# Patient Record
Sex: FEMALE | Race: WHITE | Employment: OTHER | URBAN - METROPOLITAN AREA
[De-identification: names, ages, dates, MRNs, and addresses within clinical notes are randomized per-mention and may not be internally consistent; named-entity substitution may affect disease eponyms.]

---

## 2020-12-30 PROBLEM — M19.111 POST-TRAUMATIC OSTEOARTHRITIS OF RIGHT SHOULDER: Status: ACTIVE | Noted: 2020-12-30

## 2021-01-04 PROBLEM — S72.001D CLOSED FRACTURE OF NECK OF RIGHT FEMUR WITH ROUTINE HEALING, SUBSEQUENT ENCOUNTER: Status: ACTIVE | Noted: 2018-09-28

## 2021-01-04 PROBLEM — R06.00 DYSPNEA: Status: ACTIVE | Noted: 2021-01-04

## 2021-01-04 PROBLEM — M48.062 SPINAL STENOSIS OF LUMBAR REGION WITH NEUROGENIC CLAUDICATION: Status: ACTIVE | Noted: 2017-10-13

## 2021-01-04 PROBLEM — K59.00 CONSTIPATION, UNSPECIFIED CONSTIPATION TYPE: Status: ACTIVE | Noted: 2018-10-08

## 2021-01-04 PROBLEM — M47.14 THORACIC MYELOPATHY: Status: ACTIVE | Noted: 2017-06-05

## 2021-01-04 PROBLEM — B34.8 PARAINFLUENZA: Status: ACTIVE | Noted: 2019-04-23

## 2021-01-04 PROBLEM — K59.01 SLOW TRANSIT CONSTIPATION: Status: ACTIVE | Noted: 2019-05-05

## 2021-01-04 PROBLEM — R10.9 ABDOMINAL PAIN: Status: ACTIVE | Noted: 2020-01-03

## 2021-01-04 PROBLEM — S06.4X9A EPIDURAL HEMATOMA (HCC): Status: ACTIVE | Noted: 2017-06-05

## 2021-01-04 PROBLEM — R93.89 ABNORMAL FINDING ON IMAGING: Status: ACTIVE | Noted: 2020-01-03

## 2021-01-04 PROBLEM — M25.461 EFFUSION, RIGHT KNEE: Status: ACTIVE | Noted: 2018-11-15

## 2021-01-04 PROBLEM — E03.9 HYPOTHYROID: Status: ACTIVE | Noted: 2018-11-15

## 2021-01-04 PROBLEM — I10 BENIGN ESSENTIAL HTN: Status: ACTIVE | Noted: 2018-11-15

## 2021-01-04 PROBLEM — D64.9 ANEMIA, UNSPECIFIED TYPE: Status: ACTIVE | Noted: 2018-09-28

## 2021-01-04 PROBLEM — M54.40 LUMBAGO WITH SCIATICA, UNSPECIFIED SIDE: Status: ACTIVE | Noted: 2018-10-11

## 2021-01-04 PROBLEM — J96.21 ACUTE AND CHRONIC RESPIRATORY FAILURE WITH HYPOXIA (HCC): Status: ACTIVE | Noted: 2021-01-04

## 2021-01-04 PROBLEM — I27.20 PULMONARY HYPERTENSION (HCC): Status: ACTIVE | Noted: 2019-04-23

## 2021-01-04 PROBLEM — M54.9 BACK PAIN, UNSPECIFIED BACK LOCATION, UNSPECIFIED BACK PAIN LATERALITY, UNSPECIFIED CHRONICITY: Status: ACTIVE | Noted: 2019-04-27

## 2021-01-04 PROBLEM — M25.551 PAIN IN RIGHT HIP: Status: ACTIVE | Noted: 2018-09-28

## 2021-01-04 PROBLEM — K86.89 MASS OF PANCREAS: Status: ACTIVE | Noted: 2020-01-03

## 2021-01-04 PROBLEM — M48.00 SPINAL STENOSIS, UNSPECIFIED SPINAL REGION: Status: ACTIVE | Noted: 2019-05-24

## 2021-01-04 PROBLEM — N18.2 CKD (CHRONIC KIDNEY DISEASE) STAGE 2, GFR 60-89 ML/MIN: Status: ACTIVE | Noted: 2018-11-05

## 2021-01-04 PROBLEM — R14.0 GASES: Status: ACTIVE | Noted: 2018-10-18

## 2021-01-04 PROBLEM — R53.1 WEAKNESS: Status: ACTIVE | Noted: 2018-09-28

## 2021-01-04 PROBLEM — M25.511 ACUTE PAIN OF RIGHT SHOULDER: Status: ACTIVE | Noted: 2019-05-09

## 2021-01-04 PROBLEM — J44.9 CHRONIC OBSTRUCTIVE PULMONARY DISEASE, UNSPECIFIED COPD TYPE (HCC): Status: ACTIVE | Noted: 2019-04-23

## 2021-02-12 RX ORDER — SODIUM PHOSPHATE,MONO-DIBASIC 19G-7G/118
1 ENEMA (ML) RECTAL ONCE AS NEEDED
COMMUNITY

## 2021-02-12 RX ORDER — MENTHOL 40 MG/ML
GEL TOPICAL EVERY 8 HOURS PRN
COMMUNITY

## 2021-02-12 RX ORDER — CALCIUM CARBONATE 200(500)MG
1 TABLET,CHEWABLE ORAL 3 TIMES DAILY PRN
COMMUNITY

## 2021-02-12 RX ORDER — POTASSIUM CHLORIDE 1.5 G/1.77G
20 POWDER, FOR SOLUTION ORAL DAILY
COMMUNITY

## 2021-02-12 RX ORDER — SENNOSIDES 8.6 MG
8.6 TABLET ORAL 2 TIMES DAILY
COMMUNITY

## 2021-02-12 RX ORDER — CALAMINE
LOTION (ML) TOPICAL 3 TIMES DAILY PRN
COMMUNITY

## 2021-02-12 RX ORDER — DIPHENHYDRAMINE CITRATE AND IBUPROFEN 38; 200 MG/1; MG/1
1 TABLET, COATED ORAL AS NEEDED
COMMUNITY

## 2021-02-12 RX ORDER — TORSEMIDE 20 MG/1
20 TABLET ORAL 2 TIMES DAILY
Status: ON HOLD | COMMUNITY
End: 2021-02-18

## 2021-02-13 NOTE — PAT NURSING NOTE
Spoke with Dhiraj Samayoa at [facility] re faxing medication list, will fax over list, Patient had a negative COVID test today and they would be unable to re peat test this weekend due to their lab is closed due to the holiday Monday.

## 2021-02-15 RX ORDER — MEMANTINE HYDROCHLORIDE 10 MG/1
10 TABLET ORAL DAILY
COMMUNITY
End: 2021-02-15

## 2021-02-16 ENCOUNTER — HOSPITAL ENCOUNTER (INPATIENT)
Facility: HOSPITAL | Age: 86
LOS: 2 days | Discharge: NURSING, REHAB OR LONG-TERM CARE | DRG: 483 | End: 2021-02-18
Attending: ORTHOPAEDIC SURGERY | Admitting: ORTHOPAEDIC SURGERY
Payer: MEDICARE

## 2021-02-16 ENCOUNTER — ANESTHESIA (OUTPATIENT)
Dept: SURGERY | Facility: HOSPITAL | Age: 86
DRG: 483 | End: 2021-02-16
Payer: MEDICARE

## 2021-02-16 ENCOUNTER — ANESTHESIA EVENT (OUTPATIENT)
Dept: SURGERY | Facility: HOSPITAL | Age: 86
DRG: 483 | End: 2021-02-16
Payer: MEDICARE

## 2021-02-16 ENCOUNTER — APPOINTMENT (OUTPATIENT)
Dept: GENERAL RADIOLOGY | Facility: HOSPITAL | Age: 86
DRG: 483 | End: 2021-02-16
Attending: ORTHOPAEDIC SURGERY
Payer: MEDICARE

## 2021-02-16 DIAGNOSIS — M25.511 ACUTE PAIN OF RIGHT SHOULDER: ICD-10-CM

## 2021-02-16 DIAGNOSIS — M19.111 POST-TRAUMATIC OSTEOARTHRITIS OF RIGHT SHOULDER: ICD-10-CM

## 2021-02-16 LAB
MRSA DNA SPEC QL NAA+PROBE: POSITIVE
SARS-COV-2 RNA RESP QL NAA+PROBE: NOT DETECTED

## 2021-02-16 PROCEDURE — 76942 ECHO GUIDE FOR BIOPSY: CPT | Performed by: ANESTHESIOLOGY

## 2021-02-16 PROCEDURE — 76942 ECHO GUIDE FOR BIOPSY: CPT | Performed by: ORTHOPAEDIC SURGERY

## 2021-02-16 PROCEDURE — 88311 DECALCIFY TISSUE: CPT | Performed by: ORTHOPAEDIC SURGERY

## 2021-02-16 PROCEDURE — 0RRJ00Z REPLACEMENT OF RIGHT SHOULDER JOINT WITH REVERSE BALL AND SOCKET SYNTHETIC SUBSTITUTE, OPEN APPROACH: ICD-10-PCS | Performed by: ORTHOPAEDIC SURGERY

## 2021-02-16 PROCEDURE — 73030 X-RAY EXAM OF SHOULDER: CPT | Performed by: ORTHOPAEDIC SURGERY

## 2021-02-16 PROCEDURE — 87641 MR-STAPH DNA AMP PROBE: CPT | Performed by: ORTHOPAEDIC SURGERY

## 2021-02-16 PROCEDURE — 0LS30ZZ REPOSITION RIGHT UPPER ARM TENDON, OPEN APPROACH: ICD-10-PCS | Performed by: ORTHOPAEDIC SURGERY

## 2021-02-16 PROCEDURE — 3E0T3BZ INTRODUCTION OF ANESTHETIC AGENT INTO PERIPHERAL NERVES AND PLEXI, PERCUTANEOUS APPROACH: ICD-10-PCS | Performed by: STUDENT IN AN ORGANIZED HEALTH CARE EDUCATION/TRAINING PROGRAM

## 2021-02-16 PROCEDURE — 88305 TISSUE EXAM BY PATHOLOGIST: CPT | Performed by: ORTHOPAEDIC SURGERY

## 2021-02-16 PROCEDURE — 64415 NJX AA&/STRD BRCH PLXS IMG: CPT | Performed by: ORTHOPAEDIC SURGERY

## 2021-02-16 PROCEDURE — 36415 COLL VENOUS BLD VENIPUNCTURE: CPT | Performed by: ORTHOPAEDIC SURGERY

## 2021-02-16 DEVICE — IMPLANTABLE DEVICE
Type: IMPLANTABLE DEVICE | Site: SHOULDER | Status: FUNCTIONAL
Brand: FLEX SHOULDER SYSTEM

## 2021-02-16 RX ORDER — MORPHINE SULFATE 2 MG/ML
2 INJECTION, SOLUTION INTRAMUSCULAR; INTRAVENOUS EVERY 2 HOUR PRN
Status: DISCONTINUED | OUTPATIENT
Start: 2021-02-16 | End: 2021-02-18

## 2021-02-16 RX ORDER — BUPROPION HYDROCHLORIDE 150 MG/1
150 TABLET ORAL DAILY
Status: DISCONTINUED | OUTPATIENT
Start: 2021-02-17 | End: 2021-02-18

## 2021-02-16 RX ORDER — PRAMIPEXOLE DIHYDROCHLORIDE 0.5 MG/1
0.5 TABLET ORAL 2 TIMES DAILY PRN
Status: DISCONTINUED | OUTPATIENT
Start: 2021-02-16 | End: 2021-02-18

## 2021-02-16 RX ORDER — TRAMADOL HYDROCHLORIDE 50 MG/1
100 TABLET ORAL EVERY 6 HOURS PRN
Status: DISCONTINUED | OUTPATIENT
Start: 2021-02-16 | End: 2021-02-18

## 2021-02-16 RX ORDER — PROCHLORPERAZINE EDISYLATE 5 MG/ML
5 INJECTION INTRAMUSCULAR; INTRAVENOUS ONCE AS NEEDED
Status: DISCONTINUED | OUTPATIENT
Start: 2021-02-16 | End: 2021-02-16 | Stop reason: HOSPADM

## 2021-02-16 RX ORDER — SODIUM PHOSPHATE, DIBASIC AND SODIUM PHOSPHATE, MONOBASIC 7; 19 G/133ML; G/133ML
1 ENEMA RECTAL ONCE AS NEEDED
Status: DISCONTINUED | OUTPATIENT
Start: 2021-02-16 | End: 2021-02-18

## 2021-02-16 RX ORDER — VANCOMYCIN HYDROCHLORIDE
15 ONCE
Status: COMPLETED | OUTPATIENT
Start: 2021-02-16 | End: 2021-02-17

## 2021-02-16 RX ORDER — MORPHINE SULFATE 2 MG/ML
1 INJECTION, SOLUTION INTRAMUSCULAR; INTRAVENOUS EVERY 2 HOUR PRN
Status: DISCONTINUED | OUTPATIENT
Start: 2021-02-16 | End: 2021-02-18

## 2021-02-16 RX ORDER — TRAMADOL HYDROCHLORIDE 50 MG/1
50 TABLET ORAL EVERY 6 HOURS PRN
Status: DISCONTINUED | OUTPATIENT
Start: 2021-02-16 | End: 2021-02-18

## 2021-02-16 RX ORDER — GLYCOPYRROLATE 0.2 MG/ML
INJECTION, SOLUTION INTRAMUSCULAR; INTRAVENOUS AS NEEDED
Status: DISCONTINUED | OUTPATIENT
Start: 2021-02-16 | End: 2021-02-16 | Stop reason: SURG

## 2021-02-16 RX ORDER — GABAPENTIN 400 MG/1
400 CAPSULE ORAL 3 TIMES DAILY
Status: DISCONTINUED | OUTPATIENT
Start: 2021-02-16 | End: 2021-02-17

## 2021-02-16 RX ORDER — MIDAZOLAM HYDROCHLORIDE 1 MG/ML
INJECTION INTRAMUSCULAR; INTRAVENOUS
Status: COMPLETED | OUTPATIENT
Start: 2021-02-16 | End: 2021-02-16

## 2021-02-16 RX ORDER — PHENYLEPHRINE HCL 10 MG/ML
VIAL (ML) INJECTION AS NEEDED
Status: DISCONTINUED | OUTPATIENT
Start: 2021-02-16 | End: 2021-02-16 | Stop reason: SURG

## 2021-02-16 RX ORDER — SODIUM CHLORIDE 0.9 % (FLUSH) 0.9 %
10 SYRINGE (ML) INJECTION AS NEEDED
Status: DISCONTINUED | OUTPATIENT
Start: 2021-02-16 | End: 2021-02-18

## 2021-02-16 RX ORDER — PANTOPRAZOLE SODIUM 40 MG/1
40 TABLET, DELAYED RELEASE ORAL
Status: DISCONTINUED | OUTPATIENT
Start: 2021-02-17 | End: 2021-02-18

## 2021-02-16 RX ORDER — CLONAZEPAM 1 MG/1
1 TABLET ORAL 2 TIMES DAILY
Status: DISCONTINUED | OUTPATIENT
Start: 2021-02-16 | End: 2021-02-17

## 2021-02-16 RX ORDER — DOCUSATE SODIUM 100 MG/1
100 CAPSULE, LIQUID FILLED ORAL 2 TIMES DAILY
Status: DISCONTINUED | OUTPATIENT
Start: 2021-02-16 | End: 2021-02-18

## 2021-02-16 RX ORDER — ONDANSETRON 2 MG/ML
INJECTION INTRAMUSCULAR; INTRAVENOUS AS NEEDED
Status: DISCONTINUED | OUTPATIENT
Start: 2021-02-16 | End: 2021-02-16 | Stop reason: SURG

## 2021-02-16 RX ORDER — HYDROMORPHONE HYDROCHLORIDE 1 MG/ML
0.6 INJECTION, SOLUTION INTRAMUSCULAR; INTRAVENOUS; SUBCUTANEOUS EVERY 5 MIN PRN
Status: DISCONTINUED | OUTPATIENT
Start: 2021-02-16 | End: 2021-02-16 | Stop reason: HOSPADM

## 2021-02-16 RX ORDER — ALBUTEROL SULFATE 90 UG/1
1 AEROSOL, METERED RESPIRATORY (INHALATION) EVERY 4 HOURS PRN
Status: DISCONTINUED | OUTPATIENT
Start: 2021-02-16 | End: 2021-02-17

## 2021-02-16 RX ORDER — SODIUM CHLORIDE, SODIUM LACTATE, POTASSIUM CHLORIDE, CALCIUM CHLORIDE 600; 310; 30; 20 MG/100ML; MG/100ML; MG/100ML; MG/100ML
INJECTION, SOLUTION INTRAVENOUS CONTINUOUS
Status: DISCONTINUED | OUTPATIENT
Start: 2021-02-16 | End: 2021-02-16

## 2021-02-16 RX ORDER — MORPHINE SULFATE 4 MG/ML
4 INJECTION, SOLUTION INTRAMUSCULAR; INTRAVENOUS EVERY 2 HOUR PRN
Status: DISCONTINUED | OUTPATIENT
Start: 2021-02-16 | End: 2021-02-18

## 2021-02-16 RX ORDER — LEVOTHYROXINE SODIUM 0.03 MG/1
25 TABLET ORAL DAILY
Status: DISCONTINUED | OUTPATIENT
Start: 2021-02-17 | End: 2021-02-18

## 2021-02-16 RX ORDER — ONDANSETRON 2 MG/ML
4 INJECTION INTRAMUSCULAR; INTRAVENOUS ONCE AS NEEDED
Status: DISCONTINUED | OUTPATIENT
Start: 2021-02-16 | End: 2021-02-16 | Stop reason: HOSPADM

## 2021-02-16 RX ORDER — LIDOCAINE HYDROCHLORIDE 10 MG/ML
INJECTION, SOLUTION EPIDURAL; INFILTRATION; INTRACAUDAL; PERINEURAL AS NEEDED
Status: DISCONTINUED | OUTPATIENT
Start: 2021-02-16 | End: 2021-02-16 | Stop reason: SURG

## 2021-02-16 RX ORDER — VANCOMYCIN HYDROCHLORIDE
15
Status: COMPLETED | OUTPATIENT
Start: 2021-02-16 | End: 2021-02-16

## 2021-02-16 RX ORDER — METOPROLOL TARTRATE 5 MG/5ML
2.5 INJECTION INTRAVENOUS ONCE
Status: DISCONTINUED | OUTPATIENT
Start: 2021-02-16 | End: 2021-02-16 | Stop reason: HOSPADM

## 2021-02-16 RX ORDER — TRANEXAMIC ACID 10 MG/ML
INJECTION, SOLUTION INTRAVENOUS AS NEEDED
Status: DISCONTINUED | OUTPATIENT
Start: 2021-02-16 | End: 2021-02-16 | Stop reason: SURG

## 2021-02-16 RX ORDER — ONDANSETRON 2 MG/ML
4 INJECTION INTRAMUSCULAR; INTRAVENOUS EVERY 4 HOURS PRN
Status: ACTIVE | OUTPATIENT
Start: 2021-02-16 | End: 2021-02-18

## 2021-02-16 RX ORDER — ENOXAPARIN SODIUM 100 MG/ML
40 INJECTION SUBCUTANEOUS DAILY
Status: DISCONTINUED | OUTPATIENT
Start: 2021-02-16 | End: 2021-02-16

## 2021-02-16 RX ORDER — SENNOSIDES 8.6 MG
17.2 TABLET ORAL NIGHTLY
Status: DISCONTINUED | OUTPATIENT
Start: 2021-02-16 | End: 2021-02-18

## 2021-02-16 RX ORDER — ROCURONIUM BROMIDE 10 MG/ML
INJECTION, SOLUTION INTRAVENOUS AS NEEDED
Status: DISCONTINUED | OUTPATIENT
Start: 2021-02-16 | End: 2021-02-16 | Stop reason: SURG

## 2021-02-16 RX ORDER — POLYETHYLENE GLYCOL 3350 17 G/17G
17 POWDER, FOR SOLUTION ORAL DAILY PRN
Status: DISCONTINUED | OUTPATIENT
Start: 2021-02-16 | End: 2021-02-18

## 2021-02-16 RX ORDER — DIPHENHYDRAMINE HYDROCHLORIDE 50 MG/ML
25 INJECTION INTRAMUSCULAR; INTRAVENOUS ONCE AS NEEDED
Status: ACTIVE | OUTPATIENT
Start: 2021-02-16 | End: 2021-02-16

## 2021-02-16 RX ORDER — HYDROMORPHONE HYDROCHLORIDE 1 MG/ML
0.2 INJECTION, SOLUTION INTRAMUSCULAR; INTRAVENOUS; SUBCUTANEOUS EVERY 5 MIN PRN
Status: DISCONTINUED | OUTPATIENT
Start: 2021-02-16 | End: 2021-02-16 | Stop reason: HOSPADM

## 2021-02-16 RX ORDER — ENOXAPARIN SODIUM 100 MG/ML
40 INJECTION SUBCUTANEOUS DAILY
Status: DISCONTINUED | OUTPATIENT
Start: 2021-02-16 | End: 2021-02-18

## 2021-02-16 RX ORDER — CEFAZOLIN SODIUM/WATER 2 G/20 ML
2 SYRINGE (ML) INTRAVENOUS ONCE
Status: DISCONTINUED | OUTPATIENT
Start: 2021-02-16 | End: 2021-02-16

## 2021-02-16 RX ORDER — LATANOPROST 50 UG/ML
1 SOLUTION/ DROPS OPHTHALMIC NIGHTLY
Status: DISCONTINUED | OUTPATIENT
Start: 2021-02-16 | End: 2021-02-18

## 2021-02-16 RX ORDER — DEXAMETHASONE SODIUM PHOSPHATE 4 MG/ML
VIAL (ML) INJECTION AS NEEDED
Status: DISCONTINUED | OUTPATIENT
Start: 2021-02-16 | End: 2021-02-16 | Stop reason: SURG

## 2021-02-16 RX ORDER — ACETAMINOPHEN 325 MG/1
650 TABLET ORAL EVERY 4 HOURS PRN
Status: DISCONTINUED | OUTPATIENT
Start: 2021-02-16 | End: 2021-02-18

## 2021-02-16 RX ORDER — FAMOTIDINE 20 MG/1
20 TABLET ORAL ONCE
Status: DISCONTINUED | OUTPATIENT
Start: 2021-02-16 | End: 2021-02-16 | Stop reason: HOSPADM

## 2021-02-16 RX ORDER — BISACODYL 10 MG
10 SUPPOSITORY, RECTAL RECTAL
Status: DISCONTINUED | OUTPATIENT
Start: 2021-02-16 | End: 2021-02-18

## 2021-02-16 RX ORDER — PROCHLORPERAZINE EDISYLATE 5 MG/ML
10 INJECTION INTRAMUSCULAR; INTRAVENOUS EVERY 6 HOURS PRN
Status: ACTIVE | OUTPATIENT
Start: 2021-02-16 | End: 2021-02-18

## 2021-02-16 RX ORDER — HYDROMORPHONE HYDROCHLORIDE 1 MG/ML
0.4 INJECTION, SOLUTION INTRAMUSCULAR; INTRAVENOUS; SUBCUTANEOUS EVERY 5 MIN PRN
Status: DISCONTINUED | OUTPATIENT
Start: 2021-02-16 | End: 2021-02-16 | Stop reason: HOSPADM

## 2021-02-16 RX ORDER — LIDOCAINE HYDROCHLORIDE 10 MG/ML
INJECTION, SOLUTION INFILTRATION; PERINEURAL
Status: COMPLETED | OUTPATIENT
Start: 2021-02-16 | End: 2021-02-16

## 2021-02-16 RX ORDER — BRIMONIDINE TARTRATE 2 MG/ML
1 SOLUTION/ DROPS OPHTHALMIC 2 TIMES DAILY
Status: DISCONTINUED | OUTPATIENT
Start: 2021-02-17 | End: 2021-02-18

## 2021-02-16 RX ORDER — SODIUM CHLORIDE, SODIUM LACTATE, POTASSIUM CHLORIDE, CALCIUM CHLORIDE 600; 310; 30; 20 MG/100ML; MG/100ML; MG/100ML; MG/100ML
INJECTION, SOLUTION INTRAVENOUS CONTINUOUS
Status: DISCONTINUED | OUTPATIENT
Start: 2021-02-16 | End: 2021-02-16 | Stop reason: HOSPADM

## 2021-02-16 RX ORDER — DEXAMETHASONE SODIUM PHOSPHATE 10 MG/ML
INJECTION, SOLUTION INTRAMUSCULAR; INTRAVENOUS
Status: COMPLETED | OUTPATIENT
Start: 2021-02-16 | End: 2021-02-16

## 2021-02-16 RX ORDER — HALOPERIDOL 5 MG/ML
0.25 INJECTION INTRAMUSCULAR ONCE AS NEEDED
Status: DISCONTINUED | OUTPATIENT
Start: 2021-02-16 | End: 2021-02-16 | Stop reason: HOSPADM

## 2021-02-16 RX ORDER — NALOXONE HYDROCHLORIDE 0.4 MG/ML
80 INJECTION, SOLUTION INTRAMUSCULAR; INTRAVENOUS; SUBCUTANEOUS AS NEEDED
Status: DISCONTINUED | OUTPATIENT
Start: 2021-02-16 | End: 2021-02-16 | Stop reason: HOSPADM

## 2021-02-16 RX ORDER — SODIUM CHLORIDE, SODIUM LACTATE, POTASSIUM CHLORIDE, CALCIUM CHLORIDE 600; 310; 30; 20 MG/100ML; MG/100ML; MG/100ML; MG/100ML
INJECTION, SOLUTION INTRAVENOUS CONTINUOUS
Status: DISCONTINUED | OUTPATIENT
Start: 2021-02-16 | End: 2021-02-17

## 2021-02-16 RX ORDER — ROPIVACAINE HYDROCHLORIDE 5 MG/ML
INJECTION, SOLUTION EPIDURAL; INFILTRATION; PERINEURAL
Status: COMPLETED | OUTPATIENT
Start: 2021-02-16 | End: 2021-02-16

## 2021-02-16 RX ADMIN — ONDANSETRON 4 MG: 2 INJECTION INTRAMUSCULAR; INTRAVENOUS at 11:28:00

## 2021-02-16 RX ADMIN — ROPIVACAINE HYDROCHLORIDE 30 ML: 5 INJECTION, SOLUTION EPIDURAL; INFILTRATION; PERINEURAL at 11:11:00

## 2021-02-16 RX ADMIN — PHENYLEPHRINE HCL 100 MCG: 10 MG/ML VIAL (ML) INJECTION at 11:47:00

## 2021-02-16 RX ADMIN — ROCURONIUM BROMIDE 40 MG: 10 INJECTION, SOLUTION INTRAVENOUS at 11:28:00

## 2021-02-16 RX ADMIN — TRANEXAMIC ACID 1000 MG: 10 INJECTION, SOLUTION INTRAVENOUS at 11:54:00

## 2021-02-16 RX ADMIN — SODIUM CHLORIDE, SODIUM LACTATE, POTASSIUM CHLORIDE, CALCIUM CHLORIDE: 600; 310; 30; 20 INJECTION, SOLUTION INTRAVENOUS at 11:24:00

## 2021-02-16 RX ADMIN — MIDAZOLAM HYDROCHLORIDE 1 MG: 1 INJECTION INTRAMUSCULAR; INTRAVENOUS at 11:11:00

## 2021-02-16 RX ADMIN — DEXAMETHASONE SODIUM PHOSPHATE 4 MG: 4 MG/ML VIAL (ML) INJECTION at 11:28:00

## 2021-02-16 RX ADMIN — PHENYLEPHRINE HCL 100 MCG: 10 MG/ML VIAL (ML) INJECTION at 12:16:00

## 2021-02-16 RX ADMIN — ROCURONIUM BROMIDE 10 MG: 10 INJECTION, SOLUTION INTRAVENOUS at 12:41:00

## 2021-02-16 RX ADMIN — GLYCOPYRROLATE 0.2 MG: 0.2 INJECTION, SOLUTION INTRAMUSCULAR; INTRAVENOUS at 11:28:00

## 2021-02-16 RX ADMIN — LIDOCAINE HYDROCHLORIDE 25 MG: 10 INJECTION, SOLUTION EPIDURAL; INFILTRATION; INTRACAUDAL; PERINEURAL at 11:28:00

## 2021-02-16 RX ADMIN — PHENYLEPHRINE HCL 100 MCG: 10 MG/ML VIAL (ML) INJECTION at 13:28:00

## 2021-02-16 RX ADMIN — PHENYLEPHRINE HCL 100 MCG: 10 MG/ML VIAL (ML) INJECTION at 11:39:00

## 2021-02-16 RX ADMIN — LIDOCAINE HYDROCHLORIDE 5 ML: 10 INJECTION, SOLUTION INFILTRATION; PERINEURAL at 11:11:00

## 2021-02-16 RX ADMIN — PHENYLEPHRINE HCL 100 MCG: 10 MG/ML VIAL (ML) INJECTION at 13:35:00

## 2021-02-16 RX ADMIN — DEXAMETHASONE SODIUM PHOSPHATE 4 MG: 10 INJECTION, SOLUTION INTRAMUSCULAR; INTRAVENOUS at 11:11:00

## 2021-02-16 RX ADMIN — SODIUM CHLORIDE, SODIUM LACTATE, POTASSIUM CHLORIDE, CALCIUM CHLORIDE: 600; 310; 30; 20 INJECTION, SOLUTION INTRAVENOUS at 13:43:00

## 2021-02-16 NOTE — ANESTHESIA PREPROCEDURE EVALUATION
Anesthesia PreOp Note    HPI:     Corbin Rule is a 81year old female who presents for preoperative consultation requested by: Becky Price MD    Date of Surgery: 2/16/2021    Procedure(s):  SHOULDER TOTAL  Indication: Acute pain of right shoulde Date Noted: 10/11/2018      Constipation, unspecified constipation type         Date Noted: 10/08/2018      Anemia, unspecified type         Date Noted: 09/28/2018      Closed fracture of neck of right femur with routine healing, subsequent encounter 10/13/2012      Back pain         Date Noted: 09/19/2012      Knee pain, bilateral         Date Noted: 09/19/2012      Bilateral hand pain         Date Noted: 09/19/2012        Past Medical History:   Diagnosis Date   • Anemia    • Bilateral headaches    • 2/15/2021 at 2200    •  amLODIPine Besylate 10 MG Oral Tab, 5 mg.  , Disp: , Rfl: , 2/15/2021 at 900    •  buPROPion HCl ER, XL, 150 MG Oral Tablet 24 Hr, , Disp: , Rfl: , 2/16/2021 at 600    •  cyclobenzaprine 5 MG Oral Tab, , Disp: , Rfl: , 2/15/2021 at Disp: , Rfl: , More than a month at Unknown time    •  FLEET ENEMA 7-19 GM/118ML Rectal Enema, Place 1 enema rectally once as needed. , Disp: , Rfl: , More than a month at Unknown time    •  Polyethylene Glycol 3350 (MIRALAX OR), Take by mouth as needed. , D remember what happened, but it's listed             on my allergy list\"  Sulfa Antibiotics       ANGIOEDEMA, SWELLING, RASH    Comment:Face swells.   Sulfa Drugs Cross R*    SWELLING  Esomeprazole            OTHER (SEE COMMENTS)    Comment:Patient does not Not on file    Social History Narrative      Not on file      Available pre-op labs reviewed. Vital Signs: Body mass index is 39.68 kg/m². height is 1.549 m (5' 1\") and weight is 95.3 kg (210 lb). Her temperature is 97.6 °F (36.4 °C).  H function. Estimated PA systolic pressure is borderline normal at 37 mmHg. Mild left atrial enlargement. Mild aortic regurgitation. Mild aortic stenosis.       Neuro/Psych    (+)  neuromuscular disease (LBP), anxiety/panic attacks,  depre

## 2021-02-16 NOTE — ANESTHESIA PROCEDURE NOTES
Peripheral Block    Date/Time: 2/16/2021 11:11 AM  Performed by: Logan Arreola MD  Authorized by: Logan Arreola MD       General Information and Staff    Start Time:  2/16/2021 11:04 AM  End Time:  2/16/2021 11:11 AM  Anesthesiologist:  Hoyt Councilman (DECADRON) PF injection 10 mg/ml, 4 mg    Additional Comments    Twitch present at 1.0mA, absent at 0.4mA

## 2021-02-16 NOTE — ANESTHESIA POSTPROCEDURE EVALUATION
Patient: Amos Velazquez    Procedure Summary     Date: 02/16/21 Room / Location: [facility] OR [address] [place] [address] OR    Anesthesia Start: 5162 Anesthesia Stop: 5764    Procedure: SHOULDER TOTAL (Right Shoulder) Diagnosis:       Acute pain of right shoulder

## 2021-02-16 NOTE — H&P
[facility] Hospitalist H&P     Post operative medical management  Referring MD: Andrew Mendoza MD  Surgery: Right total shoulder arthroplasty   Date of procedure: 2/16/21    PCP: Xiomara Walton MD    History of Present Illness:   81year old female with his Cross R*    SWELLING  Esomeprazole            OTHER (SEE COMMENTS)    Comment:Patient does not recall reaction  Procaine                OTHER (SEE COMMENTS)    Comment:Patient does not recall reaction  Rofecoxib               OTHER (SEE COMMENTS)    Commen Dihydrochloride 0.5 MG Oral Tab, , Disp: , Rfl:     •  Diclofenac Sodium 50 MG Oral Tab EC, 50 mg 2 (two) times daily. , Disp: , Rfl:     •  brimonidine Tartrate 0.2 % Ophthalmic Solution, Place 1 drop into both eyes 2 (two) times daily. , Disp: 15 mL, Rfl hold today  -need to clarify her meds    Asthma:   -albuterol PRN    Depression, anxiety  -wellbutrin 150mg daily   -clonazepam 1mg BID    Restless leg syndrome:   -pramipexole 0.5mg TID     GERD  -on protonix 40mg daily     Chronic pain  -post op pain man

## 2021-02-16 NOTE — H&P
Brianna Cotter  114/1935  81year old   female  Tereza Nicholas MD     HPI:   No chief complaint on file.        The patient complains of pain located right shoulder. The pain is the same and she has pain at night and with use. She has pain with ADL. 10 MG Oral Capsule Delayed Release Take 40 mg by mouth daily.       • Ondansetron HCl (ZOFRAN) 4 mg tablet Take 4 mg by mouth.       • Pantoprazole Sodium 40 MG Oral Tab EC         • Timolol Maleate 0.5 % (DAILY) Ophthalmic Solution         • traMADol HCl Drop nightly.       • Aspirin 81 MG Oral Tab Take 81 mg by mouth daily.           HISTORY:       Past Medical History:   Diagnosis Date   • Anemia     • Bilateral headaches     • Cataracts, bilateral     • Depression     • Epilepsy ([facility] [place] [address]     • Epilepsy ( rotation.   She has joint line tenderness and crepitus     IMAGING AND TESTING:  Xrays of the patient's right shoulder with 3 views taken today and reviewed by me reveal a post-traumatic deformity of her right proximal humerus and joint space narrowing with

## 2021-02-16 NOTE — BRIEF OP NOTE
Pre-Operative Diagnosis: Acute pain of right shoulder [M25.511]  Post-traumatic osteoarthritis of right shoulder [M19.111]     Post-Operative Diagnosis: Acute pain of right shoulder [M25.511]Post-traumatic osteoarthritis of right shoulder [M19.111]      Pr

## 2021-02-16 NOTE — ANESTHESIA PROCEDURE NOTES
Airway  Urgency: Elective      General Information and Staff    Patient location during procedure: OR  Anesthesiologist: Zulma Morales MD  Performed: anesthesiologist     Indications and Patient Condition  Indications for airway management: anesthesia

## 2021-02-17 LAB
ANION GAP SERPL CALC-SCNC: 5 MMOL/L (ref 0–18)
BUN BLD-MCNC: 26 MG/DL (ref 7–18)
BUN/CREAT SERPL: 25 (ref 10–20)
CALCIUM BLD-MCNC: 9 MG/DL (ref 8.5–10.1)
CHLORIDE SERPL-SCNC: 108 MMOL/L (ref 98–112)
CO2 SERPL-SCNC: 30 MMOL/L (ref 21–32)
CREAT BLD-MCNC: 1.04 MG/DL
DEPRECATED RDW RBC AUTO: 46 FL (ref 35.1–46.3)
ERYTHROCYTE [DISTWIDTH] IN BLOOD BY AUTOMATED COUNT: 13.8 % (ref 11–15)
GLUCOSE BLD-MCNC: 133 MG/DL (ref 70–99)
HCT VFR BLD AUTO: 34.5 %
HGB BLD-MCNC: 11.1 G/DL
MCH RBC QN AUTO: 29.1 PG (ref 26–34)
MCHC RBC AUTO-ENTMCNC: 32.2 G/DL (ref 31–37)
MCV RBC AUTO: 90.6 FL
OSMOLALITY SERPL CALC.SUM OF ELEC: 303 MOSM/KG (ref 275–295)
PLATELET # BLD AUTO: 185 10(3)UL (ref 150–450)
POTASSIUM SERPL-SCNC: 4.4 MMOL/L (ref 3.5–5.1)
RBC # BLD AUTO: 3.81 X10(6)UL
SODIUM SERPL-SCNC: 143 MMOL/L (ref 136–145)
WBC # BLD AUTO: 11.3 X10(3) UL (ref 4–11)

## 2021-02-17 PROCEDURE — 97166 OT EVAL MOD COMPLEX 45 MIN: CPT

## 2021-02-17 PROCEDURE — 85027 COMPLETE CBC AUTOMATED: CPT | Performed by: ORTHOPAEDIC SURGERY

## 2021-02-17 PROCEDURE — 97110 THERAPEUTIC EXERCISES: CPT

## 2021-02-17 PROCEDURE — 80048 BASIC METABOLIC PNL TOTAL CA: CPT | Performed by: ORTHOPAEDIC SURGERY

## 2021-02-17 PROCEDURE — 94640 AIRWAY INHALATION TREATMENT: CPT

## 2021-02-17 RX ORDER — OXYCODONE HYDROCHLORIDE 5 MG/1
10 TABLET ORAL EVERY 4 HOURS PRN
Status: DISCONTINUED | OUTPATIENT
Start: 2021-02-17 | End: 2021-02-17

## 2021-02-17 RX ORDER — KETOROLAC TROMETHAMINE 15 MG/ML
15 INJECTION, SOLUTION INTRAMUSCULAR; INTRAVENOUS EVERY 6 HOURS PRN
Status: DISCONTINUED | OUTPATIENT
Start: 2021-02-17 | End: 2021-02-18

## 2021-02-17 RX ORDER — ACETAMINOPHEN 500 MG
1000 TABLET ORAL EVERY 8 HOURS SCHEDULED
Status: DISCONTINUED | OUTPATIENT
Start: 2021-02-17 | End: 2021-02-18

## 2021-02-17 RX ORDER — GABAPENTIN 100 MG/1
200 CAPSULE ORAL ONCE
Status: COMPLETED | OUTPATIENT
Start: 2021-02-17 | End: 2021-02-17

## 2021-02-17 RX ORDER — ALBUTEROL SULFATE 90 UG/1
2 AEROSOL, METERED RESPIRATORY (INHALATION) EVERY 4 HOURS PRN
Status: DISCONTINUED | OUTPATIENT
Start: 2021-02-17 | End: 2021-02-18

## 2021-02-17 RX ORDER — OXYCODONE HYDROCHLORIDE 5 MG/1
5 TABLET ORAL EVERY 4 HOURS PRN
Status: DISCONTINUED | OUTPATIENT
Start: 2021-02-17 | End: 2021-02-17

## 2021-02-17 RX ORDER — GABAPENTIN 300 MG/1
600 CAPSULE ORAL 3 TIMES DAILY
Status: DISCONTINUED | OUTPATIENT
Start: 2021-02-17 | End: 2021-02-18

## 2021-02-17 RX ORDER — OXYCODONE HYDROCHLORIDE 5 MG/1
10 TABLET ORAL EVERY 4 HOURS PRN
Status: DISCONTINUED | OUTPATIENT
Start: 2021-02-17 | End: 2021-02-18

## 2021-02-17 RX ORDER — ACETAMINOPHEN 500 MG
1000 TABLET ORAL 3 TIMES DAILY
Status: DISCONTINUED | OUTPATIENT
Start: 2021-02-17 | End: 2021-02-17

## 2021-02-17 RX ORDER — OXYCODONE HYDROCHLORIDE 5 MG/1
10 TABLET ORAL ONCE
Status: COMPLETED | OUTPATIENT
Start: 2021-02-17 | End: 2021-02-17

## 2021-02-17 RX ORDER — OXYCODONE HYDROCHLORIDE 5 MG/1
20 TABLET ORAL EVERY 4 HOURS PRN
Status: DISCONTINUED | OUTPATIENT
Start: 2021-02-17 | End: 2021-02-18

## 2021-02-17 RX ORDER — CLONAZEPAM 1 MG/1
1 TABLET ORAL 2 TIMES DAILY PRN
Status: DISCONTINUED | OUTPATIENT
Start: 2021-02-17 | End: 2021-02-18

## 2021-02-17 NOTE — PROGRESS NOTES
[facility] Hospitalist Progress Note     CC: Hospital Follow up    PCP: Maya Velásquez MD     Assessment/Plan:   81year old female with history of seizure disorder, depression, hypertension, chronic low back pain wit multiple lumbar surgeries, who presents fo kg/m²   General: Alert, no acute distress  Neck: non tender  Lungs: clear to ausculation bilaterally  Heart: Regular rate and rhythm  Abdomen: soft, non tender  Extremities: No edema  Skin: no new rash, normal color    Data Review:       Labs:     Recent L traMADol HCl, Pramipexole Dihydrochloride    Asgonzalor DO Scottie

## 2021-02-17 NOTE — CM/SW NOTE
HAYES self referred to pts chart after call from pt's RN at [facility]. Per RN pt's pcp, Dr. Ervin Round was inquring if pt can DC to SNF prior to coming back to the [facility]. Pt would need rehab prior to returning to [facility] living.      HAYES met with

## 2021-02-17 NOTE — PLAN OF CARE
Pt A+Ox4, VSS, saturating well on 3L O2 via nasal cannula. Pt extremely anxious, agitated, and pain was not controlled. Spoke with Dr. Libby Williamson and Dr. Barry Hess, see orders. Pain now controlled with PRN tramadol, tylenol, OXY-IR, and morphine.  Surgical dressi stable. Interventions:   - PT/OT as ordered. - NWB on right arm.  - Maintain sling/immobilizer on right arm except during PT exercises and ADL's. - Up with assist as much as tolerated. - Pain management with oral medications.   - Monitor incision for Encourage toileting schedule  Outcome: Progressing     Problem: DISCHARGE PLANNING  Goal: Discharge to home or other facility with appropriate resources  Description: INTERVENTIONS:  - Identify barriers to discharge w/pt and caregiver  - Include patient/fa platelets)  INTERVENTIONS:  - Avoid intramuscular injections, enemas and rectal medication administration  - Ensure safe mobilization of patient  - Hold pressure on venipuncture sites to achieve adequate hemostasis  - Assess for signs and symptoms of inter

## 2021-02-17 NOTE — PLAN OF CARE
Problem: Patient Centered Care  Goal: Patient preferences are identified and integrated in the patient's plan of care  Description: Interventions:  - What would you like us to know as we care for you? Patient is from [facility] [place].   She normally uses the scale  - Administer analgesics based on type and severity of pain and evaluate response  - Implement non-pharmacological measures as appropriate and evaluate response  - Consider cultural and social influences on pain and pain management  - Manage/alleviat form as appropriate  - Assess patient's ability to be responsible for managing their own health  - Refer to Case Management Department for coordinating discharge planning if the patient needs post-hospital services based on physician/LIP order or complex n of function  Description: INTERVENTIONS:  - Assess patient stability and activity tolerance for standing, transferring and ambulating w/ or w/o assistive devices  - Assist with transfers and ambulation using safe patient handling equipment as needed  - Ens

## 2021-02-17 NOTE — OPERATIVE REPORT
[facility]    PATIENT'S NAME: Fernanda Dorsey   ATTENDING PHYSICIAN: Kyle Quick MD   OPERATING PHYSICIAN: Kyle Quick MD   PATIENT ACCOUNT#:   [id number]    LOCATION:  [facility] PACU [address] [place] [address]  MEDICAL RECORD #:   [id number] surgery, as well as anesthetic complications including death. The patient consented to the procedure. All of her questions were answered. She was in agreement with the treatment plan.     OPERATIVE TECHNIQUE:  On 02/16/2021, the patient was seen and eval around the superior margin of the pectoralis major tendon and intraarticular portion of the long head of the biceps tendon was excised. Stump was debrided. The patient's subscapularis was marked.   A tenotomy was performed along the articular surface exte fit and stability. The proximal humeral trial was then dislocated and removed. Three #2 FiberWire sutures were placed through the remaining subscapularis stump and lesser tuberosity for later repair of the subscapularis.   The wound was thoroughly copious

## 2021-02-17 NOTE — OCCUPATIONAL THERAPY NOTE
OCCUPATIONAL THERAPY EVALUATION - INPATIENT      Room Number: 417/417-A  Evaluation Date: 2/17/2021  Type of Evaluation: Initial  Presenting Problem: (RT reverse TSA)    Physician Order: IP Consult to Occupational Therapy  Reason for Therapy: ADL/IADL Dysf Post-traumatic osteoarthritis of right shoulder    Acute pain of right shoulder      Past Medical History  Past Medical History:   Diagnosis Date   • Anemia    • Bilateral headaches    • Cataracts, bilateral    • Depression    • Disorder of thyroid    • Ep TOLERANCE  Poor      RANGE OF MOTION   LT Upper extremity ROM is within functional limits     STRENGTH ASSESSMENT  LT Upper extremity strength is within functional limits        NEUROLOGICAL FINDINGS  Denies numbness or tingling     ACTIVITIES OF DAILY PRANAV

## 2021-02-17 NOTE — PROGRESS NOTES
2/12/2021  Vincent Kelsey  114/1935  81year old   female  No name on file. HPI:   No chief complaint on file. The patient complains of pain located right shoulder. The pain is consistent with recent surgery.   The patient denies numbness and tin

## 2021-02-17 NOTE — ANESTHESIA POST-OP FOLLOW-UP NOTE
[facility]   Acute Pain Rounds Note  2021    Patient name: Wolf Bermudez 81year old female  : 1935  MRN: [id number]    Diagnosis: Acute pain of right shoulder  Post-traumatic osteoarthritis of right shoulder    S/P: Rt shoul

## 2021-02-17 NOTE — PLAN OF CARE
Problem: Patient Centered Care  Goal: Patient preferences are identified and integrated in the patient's plan of care  Description: Interventions:  - What would you like us to know as we care for you? Patient is from [facility].   She normally uses the scale  - Administer analgesics based on type and severity of pain and evaluate response  - Implement non-pharmacological measures as appropriate and evaluate response  - Consider cultural and social influences on pain and pain management  - Manage/alleviat form as appropriate  - Assess patient's ability to be responsible for managing their own health  - Refer to Case Management Department for coordinating discharge planning if the patient needs post-hospital services based on physician/LIP order or complex n of function  Description: INTERVENTIONS:  - Assess patient stability and activity tolerance for standing, transferring and ambulating w/ or w/o assistive devices  - Assist with transfers and ambulation using safe patient handling equipment as needed  - Ens

## 2021-02-18 VITALS
HEIGHT: 61 IN | RESPIRATION RATE: 16 BRPM | OXYGEN SATURATION: 95 % | WEIGHT: 210 LBS | DIASTOLIC BLOOD PRESSURE: 66 MMHG | TEMPERATURE: 98 F | HEART RATE: 63 BPM | BODY MASS INDEX: 39.65 KG/M2 | SYSTOLIC BLOOD PRESSURE: 125 MMHG

## 2021-02-18 LAB
DEPRECATED RDW RBC AUTO: 48.8 FL (ref 35.1–46.3)
ERYTHROCYTE [DISTWIDTH] IN BLOOD BY AUTOMATED COUNT: 14.2 % (ref 11–15)
HCT VFR BLD AUTO: 34.1 %
HGB BLD-MCNC: 10.6 G/DL
MCH RBC QN AUTO: 29 PG (ref 26–34)
MCHC RBC AUTO-ENTMCNC: 31.1 G/DL (ref 31–37)
MCV RBC AUTO: 93.4 FL
PLATELET # BLD AUTO: 145 10(3)UL (ref 150–450)
RBC # BLD AUTO: 3.65 X10(6)UL
WBC # BLD AUTO: 6.4 X10(3) UL (ref 4–11)

## 2021-02-18 PROCEDURE — 97535 SELF CARE MNGMENT TRAINING: CPT

## 2021-02-18 PROCEDURE — 97530 THERAPEUTIC ACTIVITIES: CPT

## 2021-02-18 PROCEDURE — 85027 COMPLETE CBC AUTOMATED: CPT | Performed by: ORTHOPAEDIC SURGERY

## 2021-02-18 RX ORDER — OXYCODONE AND ACETAMINOPHEN 10; 325 MG/1; MG/1
1 TABLET ORAL 4 TIMES DAILY
Qty: 120 TABLET | Refills: 0 | Status: SHIPPED | OUTPATIENT
Start: 2021-02-18 | End: 2021-06-22

## 2021-02-18 RX ORDER — TRAMADOL HYDROCHLORIDE 50 MG/1
50 TABLET ORAL EVERY 8 HOURS PRN
Qty: 30 TABLET | Refills: 0 | Status: SHIPPED | OUTPATIENT
Start: 2021-02-18

## 2021-02-18 RX ORDER — CLONAZEPAM 1 MG/1
1 TABLET ORAL 2 TIMES DAILY
Qty: 60 TABLET | Refills: 0 | Status: SHIPPED | OUTPATIENT
Start: 2021-02-18 | End: 2022-01-03

## 2021-02-18 RX ORDER — FENTANYL 12 UG/H
1 PATCH TRANSDERMAL
Qty: 10 PATCH | Refills: 0 | Status: SHIPPED | OUTPATIENT
Start: 2021-02-19 | End: 2021-04-15

## 2021-02-18 NOTE — CM/SW NOTE
SW followed up on DC planning. SW presented SNF list to the pt and dtr. Both are asking for [facility] spoke with Lorre Gowers at [facility] in [place] they can accept pt today.  Due to medicare rules pt would technically need 3 midn

## 2021-02-18 NOTE — PROGRESS NOTES
2/12/2021  Ulysses Guarneri  114/1935  81year old   female  No name on file. HPI:   No chief complaint on file. The patient complains of pain located right shoulder. The pain is decreased. The patient reports resolving numbness and tingling.   Jeimy Clark

## 2021-02-18 NOTE — DISCHARGE SUMMARY
General Medicine Discharge Summary     Patient ID:  Deondre Lemus  81year old  5/4/1935    Admit date: 2/16/2021    Discharge date and time: 2/18/21    Attending Physician: MD CHRISTINA Armstrong regimen as ordered  -gabapentin 400mg TID at discharge  -schedule tylenol 1g TID     Hypothyrioidism:   -on synthroid      Operative Procedures:   Procedure(s) (LRB):  SHOULDER TOTAL (Right)       Patient instructions:      Current Discharge Medication Lis (two) times daily. brimonidine Tartrate 0.2 % Ophthalmic Solution  Place 1 drop into both eyes 2 (two) times daily.     LEVOTHYROXINE SODIUM 25 MCG Oral Tab  TAKE 1 TABLET BY MOUTH EVERY DAY    Multiple Vitamins-Minerals (ICAPS AREDS FORMULA) Oral Tab

## 2021-02-18 NOTE — PROGRESS NOTES
[facility] Hospitalist Progress Note     CC: Hospital Follow up    PCP: Laurence Lobato MD     Assessment/Plan:   81year old female with history of seizure disorder, depression, hypertension, chronic low back pain wit multiple lumbar surgeries, who presents fo non tender  Lungs: clear to ausculation bilaterally  Heart: Regular rate and rhythm  Abdomen: soft, non tender  Extremities: No edema  Skin: no new rash, normal color    Data Review:       Labs:     Recent Labs   Lab 02/17/21  0746 02/18/21  0700   RBC 3.8

## 2021-02-18 NOTE — PLAN OF CARE
Pt A+Ox4, VSS, saturating well on room air, pain controlled with tramadol and oxy-ir. Up to chair with lift. Surgical dressing to R shoulder in place, CDI. Bed in lowest position and locked, call light within reach.  Plan is to discharge to rehab pending pl Monitor incision for any signs of infection.  - See additional Care Plan goals for specific interventions  Outcome: Progressing     Problem: PAIN - ADULT  Goal: Verbalizes/displays adequate comfort level or patient's stated pain goal  Description: Brenda Garcia Include patient/family/discharge partner in discharge planning  - Arrange for needed discharge resources and transportation as appropriate  - Identify discharge learning needs (meds, wound care, etc)  - Arrange for interpreters to assist at discharge as ne internal bleeding  - Monitor lab trends  - Patient is to report abnormal signs of bleeding to staff  - Avoid use of toothpicks and dental floss  - Use electric shaver for shaving  - Use soft bristle tooth brush  - Limit straining and forceful nose blowing

## 2021-02-18 NOTE — OCCUPATIONAL THERAPY NOTE
OCCUPATIONAL THERAPY TREATMENT NOTE - INPATIENT    Room Number: 417/417-A         Presenting Problem: (RT reverse TSA)     Problem List  Active Problems:    Post-traumatic osteoarthritis of right shoulder    Acute pain of right shoulder      OCCUPATIONAL T SATURATIONS  SPO2% on Room Air at Rest: 93             [address] [facility] ‘6-Clicks’ Inpatient Daily Activity Short Form  How much help from another person does the patient currently need…  -   Putting on and taking off regular l

## 2021-02-18 NOTE — PLAN OF CARE
Problem: Patient Centered Care  Goal: Patient preferences are identified and integrated in the patient's plan of care  Description: Interventions:  - What would you like us to know as we care for you? Patient is from [facility].   She normally uses the scale  - Administer analgesics based on type and severity of pain and evaluate response  - Implement non-pharmacological measures as appropriate and evaluate response  - Consider cultural and social influences on pain and pain management  - Manage/alleviat form as appropriate  - Assess patient's ability to be responsible for managing their own health  - Refer to Case Management Department for coordinating discharge planning if the patient needs post-hospital services based on physician/LIP order or complex n of function  Description: INTERVENTIONS:  - Assess patient stability and activity tolerance for standing, transferring and ambulating w/ or w/o assistive devices  - Assist with transfers and ambulation using safe patient handling equipment as needed  - Ens

## (undated) DEVICE — SUTURE ETHIBOND 1 CT-1

## (undated) DEVICE — Device

## (undated) DEVICE — SUTURE VICRYL 0 CP-1

## (undated) DEVICE — SHOULDER P.A.D II: Brand: DEROYAL

## (undated) DEVICE — DRESSING AQUACEL AG SP 3.5X10

## (undated) DEVICE — KIT DRN 1/8IN PVC 3 SPRG EVAC

## (undated) DEVICE — DRAPE SRG 90X60IN BCK TBL CVR

## (undated) DEVICE — 3M™ STERI-STRIP™ REINFORCED ADHESIVE SKIN CLOSURES, R1547, 1/2 IN X 4 IN (12 MM X 100 MM), 6 STRIPS/ENVELOPE: Brand: 3M™ STERI-STRIP™

## (undated) DEVICE — VIOLET BRAIDED (POLYGLACTIN 910), SYNTHETIC ABSORBABLE SUTURE: Brand: COATED VICRYL

## (undated) DEVICE — PERIPHERAL SCREW DRILL BIT

## (undated) DEVICE — GAMMEX® NON-LATEX PI ORTHO SIZE 7, STERILE POLYISOPRENE POWDER-FREE SURGICAL GLOVE: Brand: GAMMEX

## (undated) DEVICE — SUTURE MONOCRYL 4-0 Y845G

## (undated) DEVICE — SUTURE FIBERWIRE S AR-7200

## (undated) DEVICE — SHOULDER: Brand: MEDLINE INDUSTRIES, INC.

## (undated) DEVICE — SPONGE LAP 18X18IN 7IN LOOP

## (undated) DEVICE — 20 ML SYRINGE LUER-LOCK TIP: Brand: MONOJECT

## (undated) DEVICE — SOL  .9 1000ML BTL

## (undated) DEVICE — GOWN SURG AERO BLUE PERF LG

## (undated) DEVICE — DRAPE SRG 70X60IN SPLT U IMPRV

## (undated) DEVICE — SOL  .9 3000ML

## (undated) DEVICE — CONMED ACCESSORY ELECTRODE, NEEDLE ELECTRODE

## (undated) DEVICE — SUTURE MONOCRYL 3-0 Y936H

## (undated) DEVICE — 3M™ MEDITPORE™ SOFT CLOTH TAPE 6 IN X 10 YD 12 ROLLS/CASE 2966: Brand: 3M™ MEDIPORE™

## (undated) DEVICE — GUIDE WIRE

## (undated) DEVICE — FAN SPRAY KIT: Brand: PULSAVAC®

## (undated) DEVICE — SUTURE VICRYL 2-0 FS-1

## (undated) DEVICE — GAMMEX® PI HYBRID SIZE 6.5, STERILE POWDER-FREE SURGICAL GLOVE, POLYISOPRENE AND NEOPRENE BLEND: Brand: GAMMEX

## (undated) DEVICE — Device: Brand: STABLECUT®

## (undated) NOTE — IP AVS SNAPSHOT
Patient Demographics     Address  [address] [place] [address] [address] [address] Phone  [phone] (Home)  [phone] (Mobile) *Preferred* E-mail Address  [email or web address]      Emergency Contact(s)     Name Relation Home Work Mobile    [facility] ? PAIN:  You will be given a prescription for pain medicine. Have this filled at your local pharmacy or where your insurance plan has made arrangements. The pills may be taken every four hours for pain if needed.   Do not drive while you are taking the pa ? FOLLOW-UP:  Call the office to make an appointment to see me in about 2 weeks after your surgery. If your have a temperature over 101ºF, severe pain, or redness in your shoulder; please contact the office.   You may be asked to come back to the office ea docusate sodium 100 MG Caps  Commonly known as: COLACE      Take 100 mg by mouth daily as needed. fentaNYL 12 MCG/HR Pt72  Commonly known as: [address]  Start taking on: February 19, 2021      Place 1 patch onto the skin every third day.    Yu HERNANDEZ Rhopressa 0.02 % Soln  Generic drug: Netarsudil Dimesylate      1 drop nightly. Senna 8.6 MG Tabs  Commonly known as: SENOKOT      Take 8.6 mg by mouth 2 (two) times daily.           Timolol Maleate 0.5 % (DAILY) Soln              traMADol [id number] docusate sodium (COLACE) cap 100 mg 02/18/21 0856 Given      [id number] gabapentin (NEURONTIN) cap 600 mg 02/17/21 2045 Given      [id number] gabapentin (NEURONTIN) cap 600 mg 02/18/21 0856 Given      [id number] gabapentin (NEURONTIN) cap 600 mg 02 CBC, PLATELET; NO DIFFERENTIAL [[id number]] (Abnormal)  Resulted: 02/18/21 0783, Result status: Final result   Ordering provider: Stanislav Otto MD  02/17/21 1929 Resulting lab: [facility])    Specimen Information    Ty H&P signed by Nichole Zimmerman DO at 2/16/2021  3:05 PM  Version 2 of 2    Author: Nichole Zimmerman DO Service: Hospitalist Author Type: Physician    Filed: 2/16/2021  3:05 PM Date of Service: 2/16/2021  2:48 PM Status: Addendum    : Nichole Zimmerman DO (Phys Hydrocodone             SHORTNESS OF BREATH  Methotrexate            OTHER (SEE COMMENTS)  Oxycodone               SHORTNESS OF BREATH  Quetiapine              OTHER (SEE COMMENTS)    Comment:\"I can't remember what happened, but it's listed             on •  RHOPRESSA 0.02 % Ophthalmic Solution, 1 drop nightly.  , Disp: , Rfl:     •  Pantoprazole Sodium 40 MG Oral Tab EC, , Disp: , Rfl:     •  Timolol Maleate 0.5 % (DAILY) Ophthalmic Solution, , Disp: , Rfl:     •  traMADol HCl 50 MG Oral Tab, Take 50 mg by 81year old female with history of seizure disorder, depression, hypertension, chronic low back pain wit multiple lumbar surgeries, who presents for right total shoulder arthroplasty.     Osteoarthritis of shoulder  POD # 0 right total shoulder arthroplasty 81year old female with history of seizure disorder, depression, hypertension, chronic low back pain wit multiple lumbar surgeries, who presents for right total shoulder arthroplasty. She was seen post operatively. She is awake and alert.  Having some mild Rofecoxib               OTHER (SEE COMMENTS)    Comment:Tachycardia, SOB  Seroquel [Quetiapin*    UNKNOWN    Comment:Patient does not recall reaction     Home Medications:    •  Menthol, Topical Analgesic, (BIOFREEZE) 4 % External Gel, Apply topically ever •  brimonidine Tartrate 0.2 % Ophthalmic Solution, Place 1 drop into both eyes 2 (two) times daily. , Disp: 15 mL, Rfl: 5    •  clonazePAM 1 MG Oral Tab, Take 1 tablet (1 mg total) by mouth 2 (two) times daily. , Disp: 60 tablet, Rfl: 5    •  fentaNYL 12 MCG -post op pain management   -gabapentin TID    Hypothyrioidism:   -on synthroid     DVT prophylaxis: scd, pharmacologic therapy per ortho   Code status: FULL    Asrar Scottie BUSBY Hospitalist   [facility] Answering service [phone][AA. 1]        Electronically -post op pain: increased gabapentin to 600mg TID while here (home dose 400mg TID, will continue at discharge). -Added scheduled tylenol 1g TID. Resume her PTA narcotic regimen at discharge.  Of note she is also on fentanyl patch chronically   Stable for dis INSTILL 1 DROP IN BOTH EYES EVERY NIGHT    amLODIPine Besylate 10 MG Oral Tab  Take 10 mg by mouth daily. buPROPion HCl ER, XL, 150 MG Oral Tablet 24 Hr  Take 150 mg by mouth daily.       cyclobenzaprine 5 MG Oral Tab  Take 5 mg by mouth 3 (three) time STOP taking these medications    torsemide 20 MG Oral Tab    Activity:[AA.1] as tolerated[AA. 2]  Wound Care: as directed  Code Status:[AA.1] FULL[AA.2]    Exam on day of discharge:      02/18/21  1043   BP:    Pulse: 65   Resp:    Temp:    General: no acut Once in chair, pt participated in static and dynamic sitting balance acitvity, tolerated well, with  C/o pain in R shoulder, unable to rate. Pt participated in AROM/AAROM to R elbow, wrist and fingers, tolerating well.    Simple grooming performed with mi -   Eating meals?: A Little    AM-PAC Score:  Score: 13  Approx Degree of Impairment: 63.03%  Standardized Score (AM-PAC Scale): 32.03  CMS Modifier (G-Code): CL    FUNCTIONAL TRANSFER ASSESSMENT  Supine to Sit : Not tested  Sit to Stand: Not tested  [facility] Presenting Problem: (RT reverse TSA)    Physician Order: IP Consult to Occupational Therapy  Reason for Therapy: ADL/IADL Dysfunction and Discharge Planning    OCCUPATIONAL THERAPY ASSESSMENT     Patient is a 81year old female admitted 2/16/2021 for RT re Past Medical History  Past Medical History:   Diagnosis Date   • Anemia    • Bilateral headaches    • Cataracts, bilateral    • Depression    • Disorder of thyroid    • Epile[facility]    • Epilepsy ([facility]     unclear history   • FH: CVA (cerebrovascular acc STRENGTH ASSESSMENT  LT Upper extremity strength is within functional limits        NEUROLOGICAL FINDINGS[SF.1]  Denies numbness or tingling[SF.2]     ACTIVITIES OF DAILY LIVING ASSESSMENT  AM-PAC ‘6-Clicks’ Inpatient Daily Activity Short Form  How much he Original Note by Jonathan Mcwilliams OT (Occupational Therapist) filed at 2/17/2021  1:26 PM       OCCUPATIONAL THERAPY EVALUATION - INPATIENT      Room Number: 417/417-A  Evaluation Date: 2/17/2021  Type of Evaluation: Initial  Presenting Problem: (RT reve Problem List   Active Problems:    Post-traumatic osteoarthritis of right shoulder    Acute pain of right shoulder      Past Medical History  Past Medical History:   Diagnosis Date   • Anemia    • Bilateral headaches    • Cataracts, bilateral    • Depressi Management Techniques: (distraction)    ACTIVITY TOLERANCE  Poor      RANGE OF MOTION   LT Upper extremity ROM is within functional limits     STRENGTH ASSESSMENT  LT Upper extremity strength is within functional limits        NEUROLOGICAL FINDINGS Related Notes: Addendum by Prasanna Ribeiro OT (Occupational Therapist) filed at 2/17/2021  1:26 PM       OCCUPATIONAL THERAPY EVALUATION - INPATIENT      Room Number: 417/417-A  Evaluation Date: 2/17/2021  Type of Evaluation: Initial  Presenting Proble OCCUPATIONAL THERAPY MEDICAL/SOCIAL HISTORY     Problem List   Active Problems:    Post-traumatic osteoarthritis of right shoulder    Acute pain of right shoulder      Past Medical History  Past Medical History:   Diagnosis Date   • Anemia    • Bilateral h Location: (RT shd)  Management Techniques: (distraction)    ACTIVITY TOLERANCE  Poor      RANGE OF MOTION   LT Upper extremity ROM is within functional limits     STRENGTH ASSESSMENT  LT Upper extremity strength is within functional limits        NEUROLOGI 3/4/2021 10:00 AM Ruth Carroll MD Orthopaedics - [address], [facility]      Multidisciplinary Problems     Active Goals        Problem: Patient/Family Goals    Goal Priority Disciplines Outcome Interventions   Patient/Family Long Term Goal

## (undated) NOTE — IP AVS SNAPSHOT
[facility]            (For Outpatient Use Only) Initial Admit Date: 2/16/2021   Inpt/Obs Admit Date: Inpt: 2/16/21 / Obs: N/A   Discharge Date:    Link Pattee:  [de-identified]   MRN: [de-identified]   CSN: [id number]   CEID: [id number]        [facility] Payor:  Plan:    Group Number:  Insurance Type:    Subscriber Name:  Subscriber :    Subscriber ID:  Pt Rel to Subscriber:    Hospital Account Financial Class: Medicare    2021